# Patient Record
Sex: MALE | Race: WHITE | NOT HISPANIC OR LATINO | Employment: FULL TIME | URBAN - NONMETROPOLITAN AREA
[De-identification: names, ages, dates, MRNs, and addresses within clinical notes are randomized per-mention and may not be internally consistent; named-entity substitution may affect disease eponyms.]

---

## 2022-06-02 ENCOUNTER — APPOINTMENT (EMERGENCY)
Dept: CT IMAGING | Facility: HOSPITAL | Age: 42
End: 2022-06-02
Payer: COMMERCIAL

## 2022-06-02 ENCOUNTER — HOSPITAL ENCOUNTER (EMERGENCY)
Facility: HOSPITAL | Age: 42
Discharge: HOME | End: 2022-06-02
Attending: EMERGENCY MEDICINE
Payer: COMMERCIAL

## 2022-06-02 VITALS
DIASTOLIC BLOOD PRESSURE: 95 MMHG | TEMPERATURE: 97.6 F | HEART RATE: 96 BPM | WEIGHT: 165 LBS | BODY MASS INDEX: 24.44 KG/M2 | RESPIRATION RATE: 19 BRPM | HEIGHT: 69 IN | SYSTOLIC BLOOD PRESSURE: 161 MMHG | OXYGEN SATURATION: 94 %

## 2022-06-02 DIAGNOSIS — R56.9 NEW ONSET SEIZURE (HCC): Primary | ICD-10-CM

## 2022-06-02 LAB
ALBUMIN SERPL BCP-MCNC: 5.1 G/DL (ref 3.5–5)
ALP SERPL-CCNC: 79 U/L (ref 46–116)
ALT SERPL W P-5'-P-CCNC: 174 U/L (ref 12–78)
ANION GAP SERPL CALCULATED.3IONS-SCNC: 20 MMOL/L (ref 4–13)
AST SERPL W P-5'-P-CCNC: 219 U/L (ref 5–45)
ATRIAL RATE: 121 BPM
BASOPHILS # BLD AUTO: 0.04 THOUSANDS/ΜL (ref 0–0.1)
BASOPHILS NFR BLD AUTO: 1 % (ref 0–1)
BILIRUB SERPL-MCNC: 2.02 MG/DL (ref 0.2–1)
BUN SERPL-MCNC: 10 MG/DL (ref 5–25)
CALCIUM SERPL-MCNC: 10.3 MG/DL (ref 8.3–10.1)
CHLORIDE SERPL-SCNC: 91 MMOL/L (ref 100–108)
CO2 SERPL-SCNC: 21 MMOL/L (ref 21–32)
CREAT SERPL-MCNC: 1.1 MG/DL (ref 0.6–1.3)
EOSINOPHIL # BLD AUTO: 0.02 THOUSAND/ΜL (ref 0–0.61)
EOSINOPHIL NFR BLD AUTO: 0 % (ref 0–6)
ERYTHROCYTE [DISTWIDTH] IN BLOOD BY AUTOMATED COUNT: 13.2 % (ref 11.6–15.1)
GFR SERPL CREATININE-BSD FRML MDRD: 82 ML/MIN/1.73SQ M
GLUCOSE SERPL-MCNC: 147 MG/DL (ref 65–140)
GLUCOSE SERPL-MCNC: 150 MG/DL (ref 65–140)
HCT VFR BLD AUTO: 42.6 % (ref 36.5–49.3)
HGB BLD-MCNC: 14.6 G/DL (ref 12–17)
IMM GRANULOCYTES # BLD AUTO: 0.01 THOUSAND/UL (ref 0–0.2)
IMM GRANULOCYTES NFR BLD AUTO: 0 % (ref 0–2)
LYMPHOCYTES # BLD AUTO: 0.77 THOUSANDS/ΜL (ref 0.6–4.47)
LYMPHOCYTES NFR BLD AUTO: 17 % (ref 14–44)
MCH RBC QN AUTO: 33.1 PG (ref 26.8–34.3)
MCHC RBC AUTO-ENTMCNC: 34.3 G/DL (ref 31.4–37.4)
MCV RBC AUTO: 97 FL (ref 82–98)
MONOCYTES # BLD AUTO: 0.36 THOUSAND/ΜL (ref 0.17–1.22)
MONOCYTES NFR BLD AUTO: 8 % (ref 4–12)
NEUTROPHILS # BLD AUTO: 3.38 THOUSANDS/ΜL (ref 1.85–7.62)
NEUTS SEG NFR BLD AUTO: 74 % (ref 43–75)
NRBC BLD AUTO-RTO: 0 /100 WBCS
P AXIS: 63 DEGREES
PLATELET # BLD AUTO: 162 THOUSANDS/UL (ref 149–390)
PMV BLD AUTO: 10.5 FL (ref 8.9–12.7)
POTASSIUM SERPL-SCNC: 3.7 MMOL/L (ref 3.5–5.3)
PR INTERVAL: 144 MS
PROT SERPL-MCNC: 9.1 G/DL (ref 6.4–8.2)
QRS AXIS: 59 DEGREES
QRSD INTERVAL: 80 MS
QT INTERVAL: 324 MS
QTC INTERVAL: 460 MS
RBC # BLD AUTO: 4.41 MILLION/UL (ref 3.88–5.62)
SODIUM SERPL-SCNC: 132 MMOL/L (ref 136–145)
T WAVE AXIS: 33 DEGREES
VENTRICULAR RATE: 121 BPM
WBC # BLD AUTO: 4.58 THOUSAND/UL (ref 4.31–10.16)

## 2022-06-02 PROCEDURE — 36415 COLL VENOUS BLD VENIPUNCTURE: CPT | Performed by: STUDENT IN AN ORGANIZED HEALTH CARE EDUCATION/TRAINING PROGRAM

## 2022-06-02 PROCEDURE — 99285 EMERGENCY DEPT VISIT HI MDM: CPT | Performed by: EMERGENCY MEDICINE

## 2022-06-02 PROCEDURE — 85025 COMPLETE CBC W/AUTO DIFF WBC: CPT | Performed by: STUDENT IN AN ORGANIZED HEALTH CARE EDUCATION/TRAINING PROGRAM

## 2022-06-02 PROCEDURE — 80053 COMPREHEN METABOLIC PANEL: CPT | Performed by: STUDENT IN AN ORGANIZED HEALTH CARE EDUCATION/TRAINING PROGRAM

## 2022-06-02 PROCEDURE — 70450 CT HEAD/BRAIN W/O DYE: CPT

## 2022-06-02 PROCEDURE — G1004 CDSM NDSC: HCPCS

## 2022-06-02 PROCEDURE — 99285 EMERGENCY DEPT VISIT HI MDM: CPT

## 2022-06-02 PROCEDURE — 93005 ELECTROCARDIOGRAM TRACING: CPT

## 2022-06-02 PROCEDURE — 82948 REAGENT STRIP/BLOOD GLUCOSE: CPT

## 2022-06-02 PROCEDURE — 93010 ELECTROCARDIOGRAM REPORT: CPT | Performed by: INTERNAL MEDICINE

## 2022-06-02 NOTE — ED PROVIDER NOTES
History  Chief Complaint   Patient presents with    Seizure - New Onset     EMS reports call for seizure like activity  No past history  Patient bit tongue  Denies any pain  Alert and oriented upon arrival       HPI this is a 48-year-old male brought to the emergency department by EMS for seizure-like activity  Patient was in his normal state of health the past couple days as well as this morning states he does not remember what happened and only remembers waking up when the ambulance arrived at his work  Per EMS they were called as patient went unresponsive and had generalized seizure-like activity  Coworkers fortunately were able to lower patient down to the ground so he did not hit his head neck or back per sustain any other injuries from the seizure  Upon EMS arrival of stay patient was in a postictal state with no obvious seizure activity  They did note that patient did have some bleeding from his tongue as well as some tachycardia  Here in the emergency department patient is unable to recollect the events leading up to his visit  Other than some bleeding from his tongue he denies any symptoms denies any headache nausea vomiting dizziness chest pain or shortness of breath  Patient denies any previous seizure activity  Patient denies any recreational drug use  Patient states his only medical problems are hypertension hyperlipidemia  He denies any family history of seizures  Patient does report alcohol consumption, 6 pack per day  Last consumption last night denies ever experiencing withdrawal symptoms    None       History reviewed  No pertinent past medical history  Past Surgical History:   Procedure Laterality Date    KIDNEY SURGERY         History reviewed  No pertinent family history  I have reviewed and agree with the history as documented      E-Cigarette/Vaping     E-Cigarette/Vaping Substances     Social History     Tobacco Use    Smoking status: Never Smoker    Smokeless tobacco: Never Used   Substance Use Topics    Alcohol use: Yes     Comment: 3-5 drinks a day-last drink 6/1/22 at 1600    Drug use: Never        Review of Systems   Constitutional: Negative for activity change, appetite change, chills, fatigue and fever  HENT: Negative for congestion, dental problem, drooling, ear discharge, ear pain, facial swelling, postnasal drip, rhinorrhea and sinus pain  Eyes: Negative for photophobia, pain, discharge and itching  Respiratory: Negative for apnea, cough, chest tightness and shortness of breath  Cardiovascular: Negative for chest pain and leg swelling  Gastrointestinal: Negative for abdominal distention, abdominal pain, anal bleeding, constipation, diarrhea and nausea  Endocrine: Negative for cold intolerance, heat intolerance and polydipsia  Genitourinary: Negative for difficulty urinating  Musculoskeletal: Negative for arthralgias, gait problem, joint swelling and myalgias  Skin: Negative for color change and pallor  Allergic/Immunologic: Negative for immunocompromised state  Neurological: Negative for dizziness, seizures, facial asymmetry, weakness, light-headedness, numbness and headaches  Psychiatric/Behavioral: Negative for agitation, behavioral problems, confusion, decreased concentration and dysphoric mood  All other systems reviewed and are negative  Physical Exam  ED Triage Vitals [06/02/22 0657]   Temperature Pulse Respirations Blood Pressure SpO2   97 6 °F (36 4 °C) (!) 112 16 159/93 92 %      Temp Source Heart Rate Source Patient Position - Orthostatic VS BP Location FiO2 (%)   Temporal Monitor Sitting Left arm --      Pain Score       No Pain             Orthostatic Vital Signs  Vitals:    06/02/22 0657 06/02/22 0700 06/02/22 0745 06/02/22 0806   BP: 159/93 167/94 161/95    Pulse: (!) 112 (!) 122 103 96   Patient Position - Orthostatic VS: Sitting Sitting Sitting        Physical Exam  Constitutional:       Appearance: He is well-developed  HENT:      Head: Normocephalic  Comments: Small amount of bleeding from the anterior tongue likely secondary to tongue biting from seizure-like episode  Eyes:      Pupils: Pupils are equal, round, and reactive to light  Cardiovascular:      Rate and Rhythm: Regular rhythm  Tachycardia present  Pulmonary:      Effort: Pulmonary effort is normal       Breath sounds: Normal breath sounds  Abdominal:      General: Bowel sounds are normal       Palpations: Abdomen is soft  Musculoskeletal:         General: Normal range of motion  Cervical back: Normal range of motion and neck supple  Skin:     General: Skin is warm     Neurological:      Comments: Cranial nerves 2-12 intact         ED Medications  Medications - No data to display    Diagnostic Studies  Results Reviewed     Procedure Component Value Units Date/Time    Comprehensive metabolic panel [[id number]]  (Abnormal) Collected: 06/02/22 0716    Lab Status: Final result Specimen: Blood from Arm, Right Updated: 06/02/22 0736     Sodium 132 mmol/L      Potassium 3 7 mmol/L      Chloride 91 mmol/L      CO2 21 mmol/L      ANION GAP 20 mmol/L      BUN 10 mg/dL      Creatinine 1 10 mg/dL      Glucose 150 mg/dL      Calcium 10 3 mg/dL       U/L       U/L      Alkaline Phosphatase 79 U/L      Total Protein 9 1 g/dL      Albumin 5 1 g/dL      Total Bilirubin 2 02 mg/dL      eGFR 82 ml/min/1 73sq m     Narrative:      [place] guidelines for Chronic Kidney Disease (CKD):     Stage 1 with normal or high GFR (GFR > 90 mL/min/1 73 square meters)    Stage 2 Mild CKD (GFR = 60-89 mL/min/1 73 square meters)    Stage 3A Moderate CKD (GFR = 45-59 mL/min/1 73 square meters)    Stage 3B Moderate CKD (GFR = 30-44 mL/min/1 73 square meters)    Stage 4 Severe CKD (GFR = 15-29 mL/min/1 73 square meters)    Stage 5 End Stage CKD (GFR <15 mL/min/1 73 square meters)  Note: GFR calculation is accurate only with a steady state creatinine    CBC and differential [[id number]] Collected: 06/02/22 0716    Lab Status: Final result Specimen: Blood from Arm, Right Updated: 06/02/22 0721     WBC 4 58 Thousand/uL      RBC 4 41 Million/uL      Hemoglobin 14 6 g/dL      Hematocrit 42 6 %      MCV 97 fL      MCH 33 1 pg      MCHC 34 3 g/dL      RDW 13 2 %      MPV 10 5 fL      Platelets 573 Thousands/uL      nRBC 0 /100 WBCs      Neutrophils Relative 74 %      Immat GRANS % 0 %      Lymphocytes Relative 17 %      Monocytes Relative 8 %      Eosinophils Relative 0 %      Basophils Relative 1 %      Neutrophils Absolute 3 38 Thousands/µL      Immature Grans Absolute 0 01 Thousand/uL      Lymphocytes Absolute 0 77 Thousands/µL      Monocytes Absolute 0 36 Thousand/µL      Eosinophils Absolute 0 02 Thousand/µL      Basophils Absolute 0 04 Thousands/µL     Fingerstick Glucose (POCT) [[id number]]  (Abnormal) Collected: 06/02/22 0716    Lab Status: Final result Updated: 06/02/22 0717     POC Glucose 147 mg/dl                  CT head wo contrast   Final Result by Chapo Abbott MD (06/02 0800)      No acute intracranial abnormality                    Workstation performed: [id number]               Procedures  ECG 12 Lead Documentation Only    Date/Time: 6/2/2022 7:37 AM  Performed by: Quincy Bethea MD  Authorized by: Quincy Bethea MD     Indications / Diagnosis:  Seizure  ECG reviewed by me, the ED Provider: yes    Patient location:  ED  Previous ECG:     Previous ECG:  Unavailable    Comparison to cardiac monitor: Yes    Interpretation:     Interpretation: normal    Rate:     ECG rate:  120    ECG rate assessment: tachycardic    Rhythm:     Rhythm: sinus rhythm    Ectopy:     Ectopy: none    QRS:     QRS axis:  Normal  Conduction:     Conduction: normal    ST segments:     ST segments:  Normal          ED Course                             SBIRT 22yo+    Flowsheet Row Most Recent Value   SBIRT (23 yo +)    In order to provide better care to our patients, we are screening all of our patients for alcohol and drug use  Would it be okay to ask you these screening questions? Yes Filed at: 06/02/2022 0700   Initial Alcohol Screen: US AUDIT-C     1  How often do you have a drink containing alcohol? 6 Filed at: 06/02/2022 0700   2  How many drinks containing alcohol do you have on a typical day you are drinking? 4 Filed at: 06/02/2022 0700   3a  Male UNDER 65: How often do you have five or more drinks on one occasion? 6 Filed at: 06/02/2022 0700   Audit-C Score 16 Filed at: 06/02/2022 0700                MDM  Number of Diagnoses or Management Options  Risk of Complications, Morbidity, and/or Mortality  Presenting problems: moderate  Diagnostic procedures: moderate  Management options: moderate        Disposition  Final diagnoses:   New onset seizure ([address] )     Time reflects when diagnosis was documented in both MDM as applicable and the Disposition within this note     Time User Action Codes Description Comment    6/2/2022  7:26 AM Vu Griffiths [R56 9] New onset seizure [facility])       ED Disposition     ED Disposition   Discharge    Condition   Stable    Date/Time   Thu Jun 2, 2022  8:06 AM    Comment   Emilydelicia Camacho discharge to home/self care  Follow-up Information     Follow up With Specialties Details Why Contact Info Additional Information    [facility] [place]  [address] [place] [address] [address] [facility] [id number]  [address] [place] [address] [facility] [place] [address] [place] [address] [address] Karthik Etienne [facility]          Patient's Medications    No medications on file     No discharge procedures on file  PDMP Review     None           ED Provider  Attending physically available and evaluated Emily Camacho  I managed the patient along with the ED Attending      Electronically Signed by         Emerson Sanford MD  06/02/22 4223

## 2022-06-02 NOTE — ED ATTENDING ATTESTATION
6/2/2022  ITe DO, saw and evaluated the patient  I have discussed the patient with the resident/non-physician practitioner and agree with the resident's/non-physician practitioner's findings, Plan of Care, and MDM as documented in the resident's/non-physician practitioner's note, except where noted  All available labs and Radiology studies were reviewed  I was present for key portions of any procedure(s) performed by the resident/non-physician practitioner and I was immediately available to provide assistance  At this point I agree with the current assessment done in the Emergency Department  I have conducted an independent evaluation of this patient a history and physical is as follows:    ED Course     Emergency Department Note- Je James 42 y o  male MRN: [id number]    Unit/Bed#: MR76 Encounter: [id number]    Je James is a 42 y o  male who presents with   Chief Complaint   Patient presents with    Seizure - New Onset     EMS reports call for seizure like activity  No past history  Patient bit tongue  Denies any pain  Alert and oriented upon arrival           History of Present Illness   HPI:  Je James is a 42 y o  male who presents with  presents with seizure while at work  No previous history of seizure seizure disorder  Witnessed by coworkers  Last approximately 20 seconds  Patient did have a postictal phase per EMS but was fully awake alert and oriented upon arrival to the emergency department without complaints  Patient did bite the anterior portion of his tongue  Patient states he does drink approximately 6 beers daily but did not consume as much last evening  No previous alcohol withdrawal syndromes  ROS is otherwise unremarkable  Historical Information   History reviewed  No pertinent past medical history    Past Surgical History:   Procedure Laterality Date    KIDNEY SURGERY       Social History   Social History     Substance and Sexual Activity   Alcohol Use Yes    Comment: 3-5 drinks a day-last drink 22 at 1600     Social History     Substance and Sexual Activity   Drug Use Never     Social History     Tobacco Use   Smoking Status Never Smoker   Smokeless Tobacco Never Used       Family History:   Meds/Allergies     No Known Allergies    Objective   First Vitals:   Blood Pressure: 159/93 (22)  Pulse: (!) 112 (22)  Temperature: 97 6 °F (36 4 °C) (22)  Temp Source: Temporal (22)  Respirations: 16 (22)  Height: 5' 9" (175 3 cm) (22)  Weight - Scale: 74 8 kg (165 lb) (22)  SpO2: 92 % (22)    Current Vitals:   Blood Pressure: 161/95 (22 0745)  Pulse: 96 (22 0806)  Temperature: 97 6 °F (36 4 °C) (22)  Temp Source: Temporal (22)  Respirations: 19 (22 07)  Height: 5' 9" (175 3 cm) (22)  Weight - Scale: 74 8 kg (165 lb) (22)  SpO2: 94 % (22)    No intake or output data in the 24 hours ending 22 0842    Invasive Devices  Report    None                       Medical Decision Makin  Labs and head CT unremarkable  Patient remains asymptomatic    Given this was his 1st seizure, will defer anticonvulsant medications at this time but recommend neurology follow-up and no driving until he is seen and evaluated by neurologist     Recent Results (from the past 36 hour(s))   CBC and differential    Collection Time: 22  7:16 AM   Result Value Ref Range    WBC 4 58 4 31 - 10 16 Thousand/uL    RBC 4 41 3 88 - 5 62 Million/uL    Hemoglobin 14 6 12 0 - 17 0 g/dL    Hematocrit 42 6 36 5 - 49 3 %    MCV 97 82 - 98 fL    MCH 33 1 26 8 - 34 3 pg    MCHC 34 3 31 4 - 37 4 g/dL    RDW 13 2 11 6 - 15 1 %    MPV 10 5 8 9 - 12 7 fL    Platelets 283 364 - 970 Thousands/uL    nRBC 0 /100 WBCs    Neutrophils Relative 74 43 - 75 %    Immat GRANS % 0 0 - 2 %    Lymphocytes Relative 17 14 - 44 % Monocytes Relative 8 4 - 12 %    Eosinophils Relative 0 0 - 6 %    Basophils Relative 1 0 - 1 %    Neutrophils Absolute 3 38 1 85 - 7 62 Thousands/µL    Immature Grans Absolute 0 01 0 00 - 0 20 Thousand/uL    Lymphocytes Absolute 0 77 0 60 - 4 47 Thousands/µL    Monocytes Absolute 0 36 0 17 - 1 22 Thousand/µL    Eosinophils Absolute 0 02 0 00 - 0 61 Thousand/µL    Basophils Absolute 0 04 0 00 - 0 10 Thousands/µL   Comprehensive metabolic panel    Collection Time: 06/02/22  7:16 AM   Result Value Ref Range    Sodium 132 (L) 136 - 145 mmol/L    Potassium 3 7 3 5 - 5 3 mmol/L    Chloride 91 (L) 100 - 108 mmol/L    CO2 21 21 - 32 mmol/L    ANION GAP 20 (H) 4 - 13 mmol/L    BUN 10 5 - 25 mg/dL    Creatinine 1 10 0 60 - 1 30 mg/dL    Glucose 150 (H) 65 - 140 mg/dL    Calcium 10 3 (H) 8 3 - 10 1 mg/dL     (H) 5 - 45 U/L     (H) 12 - 78 U/L    Alkaline Phosphatase 79 46 - 116 U/L    Total Protein 9 1 (H) 6 4 - 8 2 g/dL    Albumin 5 1 (H) 3 5 - 5 0 g/dL    Total Bilirubin 2 02 (H) 0 20 - 1 00 mg/dL    eGFR 82 ml/min/1 73sq m   Fingerstick Glucose (POCT)    Collection Time: 06/02/22  7:16 AM   Result Value Ref Range    POC Glucose 147 (H) 65 - 140 mg/dl     CT head wo contrast   Final Result      No acute intracranial abnormality  Workstation performed: [id number]               Portions of the record may have been created with voice recognition software  Occasional wrong word or "sound a like" substitutions may have occurred due to the inherent limitations of voice recognition software          Critical Care Time  Procedures

## 2022-06-06 ENCOUNTER — HOSPITAL ENCOUNTER (OUTPATIENT)
Dept: MRI IMAGING | Facility: HOSPITAL | Age: 42
Discharge: HOME | End: 2022-06-06
Payer: COMMERCIAL

## 2022-06-06 DIAGNOSIS — G40.89 OTHER SEIZURES (HCC): ICD-10-CM

## 2022-06-06 PROCEDURE — 70553 MRI BRAIN STEM W/O & W/DYE: CPT

## 2022-06-06 PROCEDURE — A9585 GADOBUTROL INJECTION: HCPCS | Performed by: RADIOLOGY

## 2022-06-06 PROCEDURE — G1004 CDSM NDSC: HCPCS

## 2022-06-06 RX ADMIN — GADOBUTROL 7.5 ML: 604.72 INJECTION INTRAVENOUS at 08:19

## 2022-06-07 ENCOUNTER — OFFICE VISIT (OUTPATIENT)
Dept: NEUROLOGY | Facility: CLINIC | Age: 42
End: 2022-06-07
Payer: COMMERCIAL

## 2022-06-07 VITALS
WEIGHT: 175.4 LBS | HEART RATE: 73 BPM | TEMPERATURE: 97.4 F | HEIGHT: 69 IN | BODY MASS INDEX: 25.98 KG/M2 | DIASTOLIC BLOOD PRESSURE: 69 MMHG | SYSTOLIC BLOOD PRESSURE: 118 MMHG

## 2022-06-07 DIAGNOSIS — R56.9 SEIZURE (HCC): Primary | ICD-10-CM

## 2022-06-07 PROCEDURE — 99205 OFFICE O/P NEW HI 60 MIN: CPT | Performed by: STUDENT IN AN ORGANIZED HEALTH CARE EDUCATION/TRAINING PROGRAM

## 2022-06-07 RX ORDER — IRBESARTAN AND HYDROCHLOROTHIAZIDE 300; 12.5 MG/1; MG/1
TABLET, FILM COATED ORAL
COMMUNITY
Start: 2022-04-06

## 2022-06-07 RX ORDER — VERAPAMIL HYDROCHLORIDE 240 MG/1
TABLET, FILM COATED, EXTENDED RELEASE ORAL
COMMUNITY
Start: 2022-04-06

## 2022-06-07 RX ORDER — ATORVASTATIN CALCIUM 20 MG/1
TABLET, FILM COATED ORAL
COMMUNITY
Start: 2022-04-06

## 2022-06-07 NOTE — PROGRESS NOTES
[facility] [address] [facility] [address] [place] [address]  Initial Consultation    Impression/Plan    Troy Adair is a 42 y o  male presenting to the [facility] [facility] for evaluation of a single seizure-like episode  His sleep deprivation may have increased the risk of a seizure  It doesn't seem ETOH withdrawal seizueres is a likely contributor to his case  MRI was normal  I ordered a routine EEG  If his routine EEG is normal than his risk for future seizures is about 20%  His neurological exam is normal  Plan outlined below:    #Seizure  - Routine EEG  - Defer AEDs at this time  - About 20 minutes was spent during the encounter to address concerns with driving  I explained that the ED likely reported him to the [facility]  He expressed understanding that he was told at the time of presentation to no longer drive  I explained there may be paperwork from [facility] [employer] to complete on his behalf, and that I would recommend no driving for 6 months beyond his seizure  The patient implied that he would continue driving regardless  I shared with him the possible legal ramifications if he got pulled over when there is a medical hold on his license and what could happen if he had a seizure behind the wheel  I am inclined to think he will drive regardless of what he receives from the [facility]  He concerns and recommendations were repeated before the end of the encounter   - Follow up in 6 months    We discussed the pathophysiology of epilepsy/seizure and seizure safety/precautions including driving restrictions following seizures  There are no diagnoses linked to this encounter  Subjective    Troy Adair is a 42 y o  male presenting to the [facility] [address] [facility] for evaluation of a single seizure-like episode  History was gathered from the patient and reviewed from chart review  The patient presented to the hospital on 6/2/2022 for seizure-like activity    Per EMS they were called as patient went unresponsive and had generalized seizure-like activity  Coworkers were able to lower patient down to the ground so he did not hit his head neck or back per sustain any other injuries from the seizure  Upon arrival the patient was in a postictal state with no obvious seizure activity  The patient did have some bleeding from his tongue  In the emergency department he was unable to recollect the events leading up to his event  He also doesn't report any aura prior to the event in question today in the office  He did report alcohol regular ETOH consumption, 6 pack per day but he denied ever experiencing withdrawal symptoms or abruptly stopping the therapy  CTH was normal and he was discharged on no medication  He reported sleeping only 2 5 hours the night before  This is somewhat typical for him  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  Since being discharged he hasn't had any more seizures  He has had an MRI performed on 2022 which was normal      Special Features  Age/Date of seizure onset: 2022  Status epilepticus: no  Self Injury Seizures: yes - Tongue bite  Precipitating Factors: sleep deprivation  Longest seizure free interval: 5 days (current)    Epilepsy Risk Factors:  Brother  of a seizure at 22 (only one ever had)  5-7 ETOH a day  No change in frequency leading up to the event  Denies any other neurological issues    Current AEDs:  None    Prior AEDs:  None    Prior Evaluation:  - MRI brain: 2022  Normal  Independently interpreted by me and agree with report  History Reviewed: The following were reviewed and updated as appropriate: allergies, current medications, past family history, past medical history, past social history, past surgical history and problem list     Psychiatric History:  None    Social History:   Driving: No but states that he needs to drive for his job     Lives Alone: No  Occupation:      ROS:  Review of Systems Constitutional: Negative  Negative for appetite change and fever  HENT: Negative  Negative for hearing loss, tinnitus, trouble swallowing and voice change  Eyes: Negative  Negative for photophobia and pain  Respiratory: Negative  Negative for shortness of breath  Cardiovascular: Negative  Negative for palpitations  Gastrointestinal: Negative  Negative for nausea and vomiting  Endocrine: Negative  Negative for cold intolerance  Genitourinary: Negative  Negative for dysuria, frequency and urgency  Musculoskeletal: Negative  Negative for myalgias and neck pain  Skin: Negative  Negative for rash  Neurological: Negative  Negative for dizziness, tremors, seizures, syncope, facial asymmetry, speech difficulty, weakness, light-headedness, numbness and headaches  Hematological: Negative  Does not bruise/bleed easily  Psychiatric/Behavioral: Negative  Negative for confusion, hallucinations and sleep disturbance  All other systems reviewed and are negative  Objective    /69 (BP Location: Right arm, Patient Position: Sitting, Cuff Size: Adult)   Pulse 73   Temp (!) 97 4 °F (36 3 °C)   Ht 5' 9" (1 753 m)   Wt 79 6 kg (175 lb 6 4 oz)   BMI 25 90 kg/m²      General Exam  General: well developed, no acute distress  HEENT: mucous membranes moist, anicteric sclera  Neck: supple, good ROM  Extremities: no clubbing, cyanosis or edema  Skin: no rash on visible skin  Neurological Exam  Mental Status: awake, alert, and fully oriented to person, place, time, and situation  Attention and memory intact  Fund of knowledge is appropriate for age and education  There is no neglect  Language: fluency, and comprehension normal        Cranial Nerves: Pupils equal and reactive to light  Visual fields full to confrontation  Extraocular motions intact with full versions, normal pursuits and saccades  Facial strength full and symmetric  Facial sensation intact in V1-V3   Hearing intact to voice  Tongue protrudes to midline  Palate elevates symmetrically  Speech clear without notable dysarthria  Shoulder shrug activation full and symmetric  Motor: Normal bulk and tone  No pronator drift  Strength is 5/5 proximally and distally in all 4 extremities  No involuntary movements  Sensory: Sensation intact to light touch distally in all extremities  Coordination: Normal finger-to-nose  Normal rapid alternating movements  Station and gait: Casual and tandem gait normal  Normal Romberg  Reflexes: Reflexes 2+ throughout and symmetric  Both toes down going        Martínez Acosta MD   [facility]  [facility] [facility]

## 2022-06-07 NOTE — PATIENT INSTRUCTIONS
Routine EEG     No seizure medications at this time    Follow up in 6 months  Recommend no driving for 6 months until seizure free for that time

## 2022-06-15 ENCOUNTER — TELEPHONE (OUTPATIENT)
Dept: NEUROLOGY | Facility: CLINIC | Age: 42
End: 2022-06-15

## 2022-06-15 NOTE — TELEPHONE ENCOUNTER
Received call from patient's PCP requesting office notes from 6/7 visit     Fax#: [phone]    Printed and faxed

## 2022-07-19 ENCOUNTER — HOSPITAL ENCOUNTER (OUTPATIENT)
Dept: NEUROLOGY | Facility: CLINIC | Age: 42
Discharge: HOME | End: 2022-07-19
Payer: COMMERCIAL

## 2022-07-19 DIAGNOSIS — R56.9 SEIZURE (HCC): ICD-10-CM

## 2022-07-19 PROCEDURE — 95816 EEG AWAKE AND DROWSY: CPT | Performed by: PSYCHIATRY & NEUROLOGY

## 2022-07-19 PROCEDURE — 95816 EEG AWAKE AND DROWSY: CPT

## 2022-07-21 ENCOUNTER — TELEPHONE (OUTPATIENT)
Dept: NEUROLOGY | Facility: CLINIC | Age: 42
End: 2022-07-21

## 2022-07-21 NOTE — TELEPHONE ENCOUNTER
Spoke w/patient  Advised him of results and recommendations below  Patient verbalized understanding  Confirmed 12/9/22 f/u appt

## 2022-07-21 NOTE — TELEPHONE ENCOUNTER
----- Message from Raleigh Durant MD sent at 7/21/2022  9:41 AM EDT -----  OK to tell patient that EEG was normal  No changes to his plan at this time  He can follow up with me as scheduled  ----- Message -----  From: Caesar Cordero MD  Sent: 7/19/2022   3:01 PM EDT  To:  Raleigh Durant MD